# Patient Record
Sex: FEMALE | HISPANIC OR LATINO | Employment: STUDENT | URBAN - METROPOLITAN AREA
[De-identification: names, ages, dates, MRNs, and addresses within clinical notes are randomized per-mention and may not be internally consistent; named-entity substitution may affect disease eponyms.]

---

## 2024-01-20 ENCOUNTER — OFFICE VISIT (OUTPATIENT)
Dept: URGENT CARE | Facility: URGENT CARE | Age: 29
End: 2024-01-20
Payer: COMMERCIAL

## 2024-01-20 VITALS
DIASTOLIC BLOOD PRESSURE: 68 MMHG | TEMPERATURE: 98.9 F | RESPIRATION RATE: 16 BRPM | WEIGHT: 168.06 LBS | HEART RATE: 72 BPM | SYSTOLIC BLOOD PRESSURE: 110 MMHG | OXYGEN SATURATION: 98 %

## 2024-01-20 DIAGNOSIS — K13.0 CHAPPED LIPS: Primary | ICD-10-CM

## 2024-01-20 PROCEDURE — 99203 OFFICE O/P NEW LOW 30 MIN: CPT | Performed by: PHYSICIAN ASSISTANT

## 2024-01-20 NOTE — PROGRESS NOTES
Chief Complaint   Patient presents with    Mouth/Lip Problem     4 days, dry lips-burning sensation       ASSESSMENT/PLAN:  Cristina was seen today for mouth/lip problem.    Diagnoses and all orders for this visit:    Chapped lips    Avoid irritants, emollients several times a day, increase fluids.   Follow-up if not improving in the next 2 weeks    Scar Andre PA-C      SUBJECTIVE:  Cristina is a 28 year old female who presents to urgent care with  4 days of a chapped lips sensation that seems to be getting worse.  She does not feel like they are swollen but they feel dry, tight and painful especially when she moves her lips.  She tried Chapstick and has been now doing Aquaphor before bed.  Feels otherwise well.  Has not worn make-up in about 3 weeks.  No new foods.  Does eat spicy food occasionally.    ROS: Pertinent ROS neg other than the symptoms noted above in the HPI.     OBJECTIVE:  /68   Pulse 72   Temp 98.9  F (37.2  C)   Resp 16   Wt 76.2 kg (168 lb 1 oz)   SpO2 98%    GENERAL: alert and no distress  EYES: Eyes grossly normal to inspection, PERRL and conjunctivae and sclerae normal  HENT: Lips dry, appear slightly erythematous, nonswollen, no oropharynx erythema or ulcerations.  No maceration of the corner of lips but some mild angular cheilitis noted    DIAGNOSTICS    No results found for any visits on 01/20/24.     No current outpatient medications on file.     No current facility-administered medications for this visit.      There is no problem list on file for this patient.     No past medical history on file.  No past surgical history on file.  No family history on file.  Social History     Tobacco Use    Smoking status: Never    Smokeless tobacco: Never   Substance Use Topics    Alcohol use: Not on file              The plan of care was discussed with the patient. They understand and agree with the course of treatment prescribed. A printed summary was given including instructions and  medications.  The use of Dragon/Tripshareation services may have been used to construct the content in this note; any grammatical or spelling errors are non-intentional. Please contact the author of this note directly if you are in need of any clarification.

## 2024-02-21 SDOH — HEALTH STABILITY: PHYSICAL HEALTH: ON AVERAGE, HOW MANY DAYS PER WEEK DO YOU ENGAGE IN MODERATE TO STRENUOUS EXERCISE (LIKE A BRISK WALK)?: 0 DAYS

## 2024-02-21 SDOH — HEALTH STABILITY: PHYSICAL HEALTH: ON AVERAGE, HOW MANY MINUTES DO YOU ENGAGE IN EXERCISE AT THIS LEVEL?: 20 MIN

## 2024-02-21 ASSESSMENT — PATIENT HEALTH QUESTIONNAIRE - PHQ9: SUM OF ALL RESPONSES TO PHQ QUESTIONS 1-9: 10

## 2024-02-21 ASSESSMENT — SOCIAL DETERMINANTS OF HEALTH (SDOH): HOW OFTEN DO YOU GET TOGETHER WITH FRIENDS OR RELATIVES?: TWICE A WEEK

## 2024-02-21 NOTE — COMMUNITY RESOURCES LIST (ENGLISH)
02/21/2024   [facility]  N/A  For questions about this resource list or additional care needs, please contact your primary care clinic or care manager.  Phone: [phone]   Email: N/A   Address: [address] [place] [address]   Hours: N/A        Exercise and Recreation       Gym or workout facility  1  [facility] [employer] [facility] [place] Distance: 0.45 miles      In-Person   [address] [place] [address]  Language: English  Hours: Mon - Fri 3:00 PM - 9:00 PM  Fees: Free, Self Pay   Phone: ([phone]) [phone] Email: [email or web address] Website: [email or web address]://www.[employer]/parks__destinations/recreation_centers/[facility]_recreation_center/     2  [facility] [place] Distance: 0.86 miles      In-Person   [address] [place] [address]  Language: English, Hmong, Slovak, Frisian  Hours: Mon - Fri 3:00 PM - 9:00 PM , Sat 9:00 AM - 4:00 PM  Fees: Free, Self Pay   Phone: [phone] Email: [email or web address] Website: [email or web address]://www.[employer]/parks__destinations/recreation_centers/[place]_recreation_center/          Important Numbers & Websites       Emergency Services   911  [facility]   311  Poison Control   (136) 686-1518  Suicide Prevention Lifeline   (669) 835-9996 (TALK)  Child Abuse Hotline   (163) 854-1677 (4-A-Child)  Sexual Assault Hotline   (943) 532-3259 (HOPE)  National Runaway Safeline   (176) 813-3741 (RUNAWAY)  All-Options Talkline   (798) 650-9125  Substance Abuse Referral   (685) 717-4260 (HELP)

## 2024-02-21 NOTE — COMMUNITY RESOURCES LIST (ENGLISH)
02/21/2024   [facility] [employer] [facility]  N/A  For questions about this resource list or additional care needs, please contact your primary care clinic or care manager.  Phone: [phone]   Email: N/A   Address: [address] [place] [id number]   Hours: N/A        Exercise and Recreation       Gym or workout facility  1  [place] [facility]creation [place] Distance: 0.45 miles      In-Person   [address] [place] [address]  Language: English  Hours: Mon - Fri 3:00 PM - 9:00 PM  Fees: Free, Self Pay   Phone: ([phone]) [phone] Email: [email or web address] Website: [email or web address]://www.[employer]/parks__destinations/recreation_centers/[facility]_recreation_center/     2  [facility] [place] Distance: 0.86 miles      In-Person   [address] [place] [address]  Language: English, Hmong, Tuvaluan, French  Hours: Mon - Fri 3:00 PM - 9:00 PM , Sat 9:00 AM - 4:00 PM  Fees: Free, Self Pay   Phone: [phone]) [phone] Email: [email or web address] Website: [email or web address]://www.[employer]/parks__destinations/recreation_centers/[place]_recreation_center/          Important Numbers & Websites       Emergency Services   911  [facility]   311  Poison Control   (542) 746-6479  Suicide Prevention Lifeline   (549) 148-6631 (TALK)  Child Abuse Hotline   (153) 200-6664 (4-A-Child)  Sexual Assault Hotline   (853) 569-8346 (HOPE)  National Runaway Safeline   (768) 113-3719 (RUNAWAY)  All-Options Talkline   (643) 510-8693  Substance Abuse Referral   (347) 820-3103 (HELP)

## 2024-03-10 ENCOUNTER — HEALTH MAINTENANCE LETTER (OUTPATIENT)
Age: 29
End: 2024-03-10

## 2024-03-20 ENCOUNTER — OFFICE VISIT (OUTPATIENT)
Dept: FAMILY MEDICINE | Facility: CLINIC | Age: 29
End: 2024-03-20
Payer: COMMERCIAL

## 2024-03-20 VITALS
OXYGEN SATURATION: 98 % | TEMPERATURE: 98.1 F | HEIGHT: 63 IN | SYSTOLIC BLOOD PRESSURE: 105 MMHG | RESPIRATION RATE: 15 BRPM | BODY MASS INDEX: 29.66 KG/M2 | WEIGHT: 167.4 LBS | DIASTOLIC BLOOD PRESSURE: 71 MMHG | HEART RATE: 61 BPM

## 2024-03-20 DIAGNOSIS — Z11.59 NEED FOR HEPATITIS C SCREENING TEST: ICD-10-CM

## 2024-03-20 DIAGNOSIS — Z11.3 ROUTINE SCREENING FOR STI (SEXUALLY TRANSMITTED INFECTION): ICD-10-CM

## 2024-03-20 DIAGNOSIS — N92.6 IRREGULAR PERIODS/MENSTRUAL CYCLES: ICD-10-CM

## 2024-03-20 DIAGNOSIS — E04.9 ENLARGED THYROID: ICD-10-CM

## 2024-03-20 DIAGNOSIS — K62.5 BRIGHT RED BLOOD PER RECTUM: ICD-10-CM

## 2024-03-20 DIAGNOSIS — Z00.00 ROUTINE GENERAL MEDICAL EXAMINATION AT A HEALTH CARE FACILITY: ICD-10-CM

## 2024-03-20 DIAGNOSIS — Z11.4 SCREENING FOR HIV (HUMAN IMMUNODEFICIENCY VIRUS): ICD-10-CM

## 2024-03-20 PROBLEM — F41.9 ANXIETY: Status: ACTIVE | Noted: 2020-07-21

## 2024-03-20 LAB
ERYTHROCYTE [DISTWIDTH] IN BLOOD BY AUTOMATED COUNT: 13.8 % (ref 10–15)
HBA1C MFR BLD: 5.5 % (ref 0–5.6)
HCT VFR BLD AUTO: 40.6 % (ref 35–47)
HGB BLD-MCNC: 13.1 G/DL (ref 11.7–15.7)
MCH RBC QN AUTO: 28.3 PG (ref 26.5–33)
MCHC RBC AUTO-ENTMCNC: 32.3 G/DL (ref 31.5–36.5)
MCV RBC AUTO: 88 FL (ref 78–100)
PLATELET # BLD AUTO: 257 10E3/UL (ref 150–450)
RBC # BLD AUTO: 4.63 10E6/UL (ref 3.8–5.2)
WBC # BLD AUTO: 6.2 10E3/UL (ref 4–11)

## 2024-03-20 PROCEDURE — 90471 IMMUNIZATION ADMIN: CPT | Performed by: NURSE PRACTITIONER

## 2024-03-20 PROCEDURE — 83002 ASSAY OF GONADOTROPIN (LH): CPT | Performed by: NURSE PRACTITIONER

## 2024-03-20 PROCEDURE — 99000 SPECIMEN HANDLING OFFICE-LAB: CPT | Performed by: NURSE PRACTITIONER

## 2024-03-20 PROCEDURE — 87591 N.GONORRHOEAE DNA AMP PROB: CPT | Performed by: NURSE PRACTITIONER

## 2024-03-20 PROCEDURE — 86803 HEPATITIS C AB TEST: CPT | Performed by: NURSE PRACTITIONER

## 2024-03-20 PROCEDURE — 90715 TDAP VACCINE 7 YRS/> IM: CPT | Performed by: NURSE PRACTITIONER

## 2024-03-20 PROCEDURE — 84443 ASSAY THYROID STIM HORMONE: CPT | Performed by: NURSE PRACTITIONER

## 2024-03-20 PROCEDURE — 91320 SARSCV2 VAC 30MCG TRS-SUC IM: CPT | Performed by: NURSE PRACTITIONER

## 2024-03-20 PROCEDURE — 84270 ASSAY OF SEX HORMONE GLOBUL: CPT | Performed by: NURSE PRACTITIONER

## 2024-03-20 PROCEDURE — 83036 HEMOGLOBIN GLYCOSYLATED A1C: CPT | Performed by: NURSE PRACTITIONER

## 2024-03-20 PROCEDURE — 80053 COMPREHEN METABOLIC PANEL: CPT | Performed by: NURSE PRACTITIONER

## 2024-03-20 PROCEDURE — 36415 COLL VENOUS BLD VENIPUNCTURE: CPT | Performed by: NURSE PRACTITIONER

## 2024-03-20 PROCEDURE — 87389 HIV-1 AG W/HIV-1&-2 AB AG IA: CPT | Performed by: NURSE PRACTITIONER

## 2024-03-20 PROCEDURE — 99395 PREV VISIT EST AGE 18-39: CPT | Mod: 25 | Performed by: NURSE PRACTITIONER

## 2024-03-20 PROCEDURE — 83498 ASY HYDROXYPROGESTERONE 17-D: CPT | Performed by: NURSE PRACTITIONER

## 2024-03-20 PROCEDURE — 87491 CHLMYD TRACH DNA AMP PROBE: CPT | Performed by: NURSE PRACTITIONER

## 2024-03-20 PROCEDURE — 90480 ADMN SARSCOV2 VAC 1/ONLY CMP: CPT | Performed by: NURSE PRACTITIONER

## 2024-03-20 PROCEDURE — 84146 ASSAY OF PROLACTIN: CPT | Performed by: NURSE PRACTITIONER

## 2024-03-20 PROCEDURE — 82533 TOTAL CORTISOL: CPT | Performed by: NURSE PRACTITIONER

## 2024-03-20 PROCEDURE — 99214 OFFICE O/P EST MOD 30 MIN: CPT | Mod: 25 | Performed by: NURSE PRACTITIONER

## 2024-03-20 PROCEDURE — 82626 DEHYDROEPIANDROSTERONE: CPT | Mod: 90 | Performed by: NURSE PRACTITIONER

## 2024-03-20 PROCEDURE — 85027 COMPLETE CBC AUTOMATED: CPT | Performed by: NURSE PRACTITIONER

## 2024-03-20 PROCEDURE — 84403 ASSAY OF TOTAL TESTOSTERONE: CPT | Performed by: NURSE PRACTITIONER

## 2024-03-20 SDOH — HEALTH STABILITY: PHYSICAL HEALTH: ON AVERAGE, HOW MANY MINUTES DO YOU ENGAGE IN EXERCISE AT THIS LEVEL?: 20 MIN

## 2024-03-20 SDOH — HEALTH STABILITY: PHYSICAL HEALTH: ON AVERAGE, HOW MANY DAYS PER WEEK DO YOU ENGAGE IN MODERATE TO STRENUOUS EXERCISE (LIKE A BRISK WALK)?: 2 DAYS

## 2024-03-20 ASSESSMENT — SOCIAL DETERMINANTS OF HEALTH (SDOH): HOW OFTEN DO YOU GET TOGETHER WITH FRIENDS OR RELATIVES?: ONCE A WEEK

## 2024-03-20 ASSESSMENT — PAIN SCALES - GENERAL: PAINLEVEL: NO PAIN (0)

## 2024-03-20 ASSESSMENT — PATIENT HEALTH QUESTIONNAIRE - PHQ9
10. IF YOU CHECKED OFF ANY PROBLEMS, HOW DIFFICULT HAVE THESE PROBLEMS MADE IT FOR YOU TO DO YOUR WORK, TAKE CARE OF THINGS AT HOME, OR GET ALONG WITH OTHER PEOPLE: SOMEWHAT DIFFICULT
SUM OF ALL RESPONSES TO PHQ QUESTIONS 1-9: 10
SUM OF ALL RESPONSES TO PHQ QUESTIONS 1-9: 10

## 2024-03-20 NOTE — LETTER
My Depression Action Plan  Name: Cristina Gonzalez   Date of Birth 1995  Date: 3/20/2024    My doctor: No Ref-Primary, Physician   My clinic: [facility]  [address] [place] [address]  [phone]            GREEN    ZONE   Good Control    What it looks like:   Things are going generally well. You have normal ups and downs. You may even feel depressed from time to time, but bad moods usually last less than a day.   What you need to do:  Continue to care for yourself (see self care plan)  Check your depression survival kit and update it as needed  Follow your physician s recommendations including any medication.  Do not stop taking medication unless you consult with your physician first.             YELLOW         ZONE Getting Worse    What it looks like:   Depression is starting to interfere with your life.   It may be hard to get out of bed; you may be starting to isolate yourself from others.  Symptoms of depression are starting to last most all day and this has happened for several days.   You may have suicidal thoughts but they are not constant.   What you need to do:     Call your care team. Your response to treatment will improve if you keep your care team informed of your progress. Yellow periods are signs an adjustment may need to be made.     Continue your self-care.  Just get dressed and ready for the day.  Don't give yourself time to talk yourself out of it.    Talk to someone in your support network.    Open up your Depression Self-Care Plan/Wellness Kit.             RED    ZONE Medical Alert - Get Help    What it looks like:   Depression is seriously interfering with your life.   You may experience these or other symptoms: You can t get out of bed most days, can t work or engage in other necessary activities, you have trouble taking care of basic hygiene, or basic responsibilities, thoughts of suicide or death that will not go  away, self-injurious behavior.     What you need to do:  Call your care team and request a same-day appointment. If they are not available (weekends or after hours) call your local crisis line, emergency room or 911.          Depression Self-Care Plan / Wellness Kit    Many people find that medication and therapy are helpful treatments for managing depression. In addition, making small changes to your everyday life can help to boost your mood and improve your wellbeing. Below are some tips for you to consider. Be sure to talk with your medical provider and/or behavioral health consultant if your symptoms are worsening or not improving.     Sleep   Sleep hygiene  means all of the habits that support good, restful sleep. It includes maintaining a consistent bedtime and wake time, using your bedroom only for sleeping or sex, and keeping the bedroom dark and free of distractions like a computer, smartphone, or television.     Develop a Healthy Routine  Maintain good hygiene. Get out of bed in the morning, make your bed, brush your teeth, take a shower, and get dressed. Don t spend too much time viewing media that makes you feel stressed. Find time to relax each day.    Exercise  Get some form of exercise every day. This will help reduce pain and release endorphins, the  feel good  chemicals in your brain. It can be as simple as just going for a walk or doing some gardening, anything that will get you moving.      Diet  Strive to eat healthy foods, including fruits and vegetables. Drink plenty of water. Avoid excessive sugar, caffeine, alcohol, and other mood-altering substances.     Stay Connected with Others  Stay in touch with friends and family members.    Manage Your Mood  Try deep breathing, massage therapy, biofeedback, or meditation. Take part in fun activities when you can. Try to find something to smile about each day.     Psychotherapy  Be open to working with a therapist if your provider recommends it.      Medication  Be sure to take your medication as prescribed. Most anti-depressants need to be taken every day. It usually takes several weeks for medications to work. Not all medicines work for all people. It is important to follow-up with your provider to make sure you have a treatment plan that is working for you. Do not stop your medication abruptly without first discussing it with your provider.    Crisis Resources   These hotlines are for both adults and children. They and are open 24 hours a day, 7 days a week unless noted otherwise.    National Suicide Prevention Lifeline   988 or 8-340-506-YXQV (5025)    Crisis Text Line    www.crisistextline.org  Text HOME to 458567 from anywhere in the [place], anytime, about any type of crisis. A live, trained crisis counselor will receive the text and respond quickly.    [place] Lifeline for LGBTQ Youth  A national crisis intervention and suicide lifeline for LGBTQ youth under 25. Provides a safe place to talk without judgement. Call 1-477.251.7955; text START to 216495 or visit www.thetrevorproject.org to talk to a trained counselor.    For [facility] crisis numbers, visit the [facility] website at:  https://[place].gov/dhs/people-we-serve/adults/health-care/mental-health/resources/crisis-contacts.jsp

## 2024-03-20 NOTE — PROGRESS NOTES
"Preventive Care Visit  [facility]  DRU Hernandez CNP, Family Medicine  Mar 20, 2024      Assessment & Plan     Routine general medical examination at a health care facility  - Comprehensive metabolic panel  - CBC with platelets    Screening for HIV (human immunodeficiency virus)  - HIV Antigen Antibody Combo    Need for hepatitis C screening test  - Hepatitis C Screen Reflex to HCV RNA Quant and Genotype    Irregular periods/menstrual cycles  Irregular periods with reported episodes of prolonged or heavy bleeding with painful menstrual cramps. R/O hormonal/PCOS/structural etiology.   - Hemoglobin A1c  - TSH with free T4 reflex  - Luteinizing Hormone  - Prolactin  - Dehydroepiandrosterone  - Testosterone Free and Total  - Luteinizing Hormone  - US Pelvic Complete with Transvaginal  - Cortisol  - 17 OH progesterone    Routine screening for STI (sexually transmitted infection)  - Chlamydia trachomatis/Neisseria gonorrhoeae by PCR    Bright red blood per rectum  Also experiencing rectal pressure.  - Colonoscopy Screening  Referral    Enlarged thyroid  - US Head Neck Soft Tissue                BMI  Estimated body mass index is 30.13 kg/m  as calculated from the following:    Height as of this encounter: 1.588 m (5' 2.5\").    Weight as of this encounter: 75.9 kg (167 lb 6.4 oz).   Weight management plan: Discussed healthy diet and exercise guidelines    Counseling  Appropriate preventive services were discussed with this patient, including applicable screening as appropriate for fall prevention, nutrition, physical activity, Tobacco-use cessation, weight loss and cognition.  Checklist reviewing preventive services available has been given to the patient.  Reviewed patient's diet, addressing concerns and/or questions.   She is at risk for lack of exercise and has been provided with information to increase physical activity for the benefit of her well-being.   The patient was " instructed to see the dentist every 6 months.   The patient's PHQ-9 score is consistent with moderate depression. She was provided with information regarding depression.           Margarette Evans is a 28 year old, presenting for the following:  Recheck Medication and Physical (skin dryness and  discharge coming from my nipple.)    Concerns:   Wondering if she has PCOS: irregular periods, when gets periods has heavy bleeding, cramps are unbearable - symptoms ongoing for many years. Has a few hairs on chin. No acne currently, but gets acne around periods.     Would like diabetes screening  Grandma has diabetes on mom's side - is blind due to diabetes  Father has diabetes type 2    Seeing a therapist once a week for anxiety and depression- feels symptoms are well controlled.     Noticed drops of blood on toilet paper when wiping after having BM's, also having rectal pressure - randomly. Symptoms ongoing since April last year. Reports intermittent constipation. Has been been eating a high fiber diet but symptoms persist. BM's daily or twice daily. Avoids lactose given lactose intolerance- removed lactose from diet and symptoms still there.  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    HPI              3/20/2024   General Health   How would you rate your overall physical health? (!) FAIR   Feel stress (tense, anxious, or unable to sleep) To some extent   (!) STRESS CONCERN      3/20/2024   Nutrition   Three or more servings of calcium each day? (!) NO   Diet: Regular (no restrictions)   How many servings of fruit and vegetables per day? (!) 2-3   How many sweetened beverages each  "day? (!) 3         3/20/2024   Exercise   Days per week of moderate/strenous exercise 2 days   Average minutes spent exercising at this level 20 min   (!) EXERCISE CONCERN      3/20/2024   Social Factors   Frequency of gathering with friends or relatives Once a week   Worry food won't last until get money to buy more No   Food not last or not have enough money for food? No   Do you have housing?  Yes   Are you worried about losing your housing? No   Lack of transportation? No   Unable to get utilities (heat,electricity)? No         3/20/2024   Dental   Dentist two times every year? (!) NO         3/20/2024   TB Screening   Were you born outside of the [place]? No       Today's PHQ-9 Score:       3/20/2024     8:47 AM   PHQ-9 SCORE   PHQ-9 Total Score MyChart 10 (Moderate depression)   PHQ-9 Total Score 10         3/20/2024   Substance Use   Alcohol more than 3/day or more than 7/wk Not Applicable   Do you use any other substances recreationally? No     Social History     Tobacco Use    Smoking status: Never    Smokeless tobacco: Never   Vaping Use    Vaping Use: Never used             3/20/2024   Breast Cancer Screening   Family history of breast, colon, or ovarian cancer? No / Unknown      Mammogram Screening - Patient under 40 years of age: Routine Mammogram Screening not recommended.           3/20/2024   One time HIV Screening   Previous HIV test? No         3/20/2024   STI Screening   New sexual partner(s) since last STI/HIV test? No     History of abnormal Pap smear: NO - age 30- 65 PAP every 3 years recommended           3/20/2024   Contraception/Family Planning   Questions about contraception or family planning No        Reviewed and updated as needed this visit by Provider         Sedrick Mosley                     Objective    Exam  /71 (BP Location: Left arm, Patient Position: Sitting, Cuff Size: Adult Large)   Pulse 61   Temp 98.1  F (36.7  C)   Resp 15   Ht 1.588 m (5' 2.5\")   Wt 75.9 kg (167 lb 6.4 oz)  " " LMP 02/29/2024 (Approximate)   SpO2 98%   BMI 30.13 kg/m     Estimated body mass index is 30.13 kg/m  as calculated from the following:    Height as of this encounter: 1.588 m (5' 2.5\").    Weight as of this encounter: 75.9 kg (167 lb 6.4 oz).    Physical Exam  GENERAL: alert and no distress  EYES: Eyes grossly normal to inspection, PERRL and conjunctivae and sclerae normal  HENT: ear canals and TM's normal, nose and mouth without ulcers or lesions  NECK: no adenopathy, no asymmetry, masses, or scars  RESP: lungs clear to auscultation - no rales, rhonchi or wheezes  CV: regular rate and rhythm, normal S1 S2, no S3 or S4, no murmur, click or rub, no peripheral edema  ABDOMEN: soft, nontender, no hepatosplenomegaly, no masses and bowel sounds normal  RECTAL: no rectal fissures- positive for hemorrhoidal skin tag x1  MS: no gross musculoskeletal defects noted, no edema  SKIN: no suspicious lesions or rashes  NEURO: Normal strength and tone, mentation intact and speech normal  PSYCH: mentation appears normal, affect normal/bright        Signed Electronically by: DRU Henrandez CNP    "

## 2024-03-20 NOTE — PATIENT INSTRUCTIONS
Preventive Care Advice   This is general advice given by our system to help you stay healthy. However, your care team may have specific advice just for you. Please talk to your care team about your preventive care needs.  Nutrition  Eat 5 or more servings of fruits and vegetables each day.  Try wheat bread, brown rice and whole grain pasta (instead of white bread, rice, and pasta).  Get enough calcium and vitamin D. Check the label on foods and aim for 100% of the RDA (recommended daily allowance).  Lifestyle  Exercise at least 150 minutes each week   (30 minutes a day, 5 days a week).  Do muscle strengthening activities 2 days a week. These help control your weight and prevent disease.  No smoking.  Wear sunscreen to prevent skin cancer.  Have a dental exam and cleaning every 6 months.  Yearly exams  See your health care team every year to talk about:  Any changes in your health.  Any medicines your care team has prescribed.  Preventive care, family planning, and ways to prevent chronic diseases.  Shots (vaccines)   HPV shots (up to age 26), if you've never had them before.  Hepatitis B shots (up to age 59), if you've never had them before.  COVID-19 shot: Get this shot when it's due.  Flu shot: Get a flu shot every year.  Tetanus shot: Get a tetanus shot every 10 years.  Pneumococcal, hepatitis A, and RSV shots: Ask your care team if you need these based on your risk.  Shingles shot (for age 50 and up).  General health tests  Diabetes screening:  Starting at age 35, Get screened for diabetes at least every 3 years.  If you are younger than age 35, ask your care team if you should be screened for diabetes.  Cholesterol test: At age 39, start having a cholesterol test every 5 years, or more often if advised.  Bone density scan (DEXA): At age 50, ask your care team if you should have this scan for osteoporosis (brittle bones).  Hepatitis C: Get tested at least once in your life.  STIs (sexually transmitted  infections)  Before age 24: Ask your care team if you should be screened for STIs.  After age 24: Get screened for STIs if you're at risk. You are at risk for STIs (including HIV) if:  You are sexually active with more than one person.  You don't use condoms every time.  You or a partner was diagnosed with a sexually transmitted infection.  If you are at risk for HIV, ask about PrEP medicine to prevent HIV.  Get tested for HIV at least once in your life, whether you are at risk for HIV or not.  Cancer screening tests  Cervical cancer screening: If you have a cervix, begin getting regular cervical cancer screening tests at age 21. Most people who have regular screenings with normal results can stop after age 65. Talk about this with your provider.  Breast cancer scan (mammogram): If you've ever had breasts, begin having regular mammograms starting at age 40. This is a scan to check for breast cancer.  Colon cancer screening: It is important to start screening for colon cancer at age 45.  Have a colonoscopy test every 10 years (or more often if you're at risk) Or, ask your provider about stool tests like a FIT test every year or Cologuard test every 3 years.  To learn more about your testing options, visit: https://www.[employer]/104121.pdf.  For help making a decision, visit: https://bit.ly/yh96305.  Prostate cancer screening test: If you have a prostate and are age 55 to 69, ask your provider if you would benefit from a yearly prostate cancer screening test.  Lung cancer screening: If you are a current or former smoker age 50 to 80, ask your care team if ongoing lung cancer screenings are right for you.  For informational purposes only. Not to replace the advice of your health care provider. Copyright   2023 [employer]. All rights reserved. Clinically reviewed by the [facility] Transitions Program. [employer] 359351 - REV 01/24.    Learning About Stress  What is stress?     Stress is your  body's response to a hard situation. Your body can have a physical, emotional, or mental response. Stress is a fact of life for most people, and it affects everyone differently. What causes stress for you may not be stressful for someone else.  A lot of things can cause stress. You may feel stress when you go on a job interview, take a test, or run a race. This kind of short-term stress is normal and even useful. It can help you if you need to work hard or react quickly. For example, stress can help you finish an important job on time.  Long-term stress is caused by ongoing stressful situations or events. Examples of long-term stress include long-term health problems, ongoing problems at work, or conflicts in your family. Long-term stress can harm your health.  How does stress affect your health?  When you are stressed, your body responds as though you are in danger. It makes hormones that speed up your heart, make you breathe faster, and give you a burst of energy. This is called the fight-or-flight stress response. If the stress is over quickly, your body goes back to normal and no harm is done.  But if stress happens too often or lasts too long, it can have bad effects. Long-term stress can make you more likely to get sick, and it can make symptoms of some diseases worse. If you tense up when you are stressed, you may develop neck, shoulder, or low back pain. Stress is linked to high blood pressure and heart disease.  Stress also harms your emotional health. It can make you deal, tense, or depressed. Your relationships may suffer, and you may not do well at work or school.  What can you do to manage stress?  You can try these things to help manage stress:   Do something active. Exercise or activity can help reduce stress. Walking is a great way to get started. Even everyday activities such as housecleaning or yard work can help.  Try yoga or liv chi. These techniques combine exercise and meditation. You may need  some training at first to learn them.  Do something you enjoy. For example, listen to music or go to a movie. Practice your hobby or do volunteer work.  Meditate. This can help you relax, because you are not worrying about what happened before or what may happen in the future.  Do guided imagery. Imagine yourself in any setting that helps you feel calm. You can use online videos, books, or a teacher to guide you.  Do breathing exercises. For example:  From a standing position, bend forward from the waist with your knees slightly bent. Let your arms dangle close to the floor.  Breathe in slowly and deeply as you return to a standing position. Roll up slowly and lift your head last.  Hold your breath for just a few seconds in the standing position.  Breathe out slowly and bend forward from the waist.  Let your feelings out. Talk, laugh, cry, and express anger when you need to. Talking with supportive friends or family, a counselor, or a shaw leader about your feelings is a healthy way to relieve stress. Avoid discussing your feelings with people who make you feel worse.  Write. It may help to write about things that are bothering you. This helps you find out how much stress you feel and what is causing it. When you know this, you can find better ways to cope.  What can you do to prevent stress?  You might try some of these things to help prevent stress:  Manage your time. This helps you find time to do the things you want and need to do.  Get enough sleep. Your body recovers from the stresses of the day while you are sleeping.  Get support. Your family, friends, and community can make a difference in how you experience stress.  Limit your news feed. Avoid or limit time on social media or news that may make you feel stressed.  Do something active. Exercise or activity can help reduce stress. Walking is a great way to get started.  Where can you learn more?  Go to https://www.healthwise.net/patiented  Enter N032 in the  "search box to learn more about \"Learning About Stress.\"  Current as of: October 24, 2023               Content Version: 14.0    3843-9026 [employer].   Care instructions adapted under license by your healthcare professional. If you have questions about a medical condition or this instruction, always ask your healthcare professional. [employer] disclaims any warranty or liability for your use of this information.      Learning About Depression Screening  What is depression screening?  Depression screening is a way to see if you have depression symptoms. It may be done by a doctor or counselor. It's often part of a routine checkup. That's because your mental health is just as important as your physical health.  Depression is a mental health condition that affects how you feel, think, and act. You may:  Have less energy.  Lose interest in your daily activities.  Feel sad and grouchy for a long time.  Depression is very common. It affects people of all ages.  Many things can lead to depression. Some people become depressed after they have a stroke or find out they have a major illness like cancer or heart disease. The death of a loved one or a breakup may lead to depression. It can run in families. Most experts believe that a combination of inherited genes and stressful life events can cause it.  What happens during screening?  You may be asked to fill out a form about your depression symptoms. You and the doctor will discuss your answers. The doctor may ask you more questions to learn more about how you think, act, and feel.  What happens after screening?  If you have symptoms of depression, your doctor will talk to you about your options.  Doctors usually treat depression with medicines or counseling. Often, combining the two works best. Many people don't get help because they think that they'll get over the depression on their own. But people with depression may not get better unless they " "get treatment.  The cause of depression is not well understood. There may be many factors involved. But if you have depression, it's not your fault.  A serious symptom of depression is thinking about death or suicide. If you or someone you care about talks about this or about feeling hopeless, get help right away.  It's important to know that depression can be treated. Medicine, counseling, and self-care may help.  Where can you learn more?  Go to https://www.[employer].net/patiented  Enter T185 in the search box to learn more about \"Learning About Depression Screening.\"  Current as of: June 24, 2023               Content Version: 14.0    7323-1157 [employer].   Care instructions adapted under license by your healthcare professional. If you have questions about a medical condition or this instruction, always ask your healthcare professional. [employer] disclaims any warranty or liability for your use of this information.      "

## 2024-03-21 LAB
ALBUMIN SERPL BCG-MCNC: 4.5 G/DL (ref 3.5–5.2)
ALP SERPL-CCNC: 79 U/L (ref 40–150)
ALT SERPL W P-5'-P-CCNC: 43 U/L (ref 0–50)
ANION GAP SERPL CALCULATED.3IONS-SCNC: 11 MMOL/L (ref 7–15)
AST SERPL W P-5'-P-CCNC: 25 U/L (ref 0–45)
BILIRUB SERPL-MCNC: 0.3 MG/DL
BUN SERPL-MCNC: 9.1 MG/DL (ref 6–20)
C TRACH DNA SPEC QL PROBE+SIG AMP: NEGATIVE
CALCIUM SERPL-MCNC: 9.4 MG/DL (ref 8.6–10)
CHLORIDE SERPL-SCNC: 104 MMOL/L (ref 98–107)
CORTIS SERPL-MCNC: 4.2 UG/DL
CREAT SERPL-MCNC: 0.64 MG/DL (ref 0.51–0.95)
DEPRECATED HCO3 PLAS-SCNC: 24 MMOL/L (ref 22–29)
EGFRCR SERPLBLD CKD-EPI 2021: >90 ML/MIN/1.73M2
GLUCOSE SERPL-MCNC: 113 MG/DL (ref 70–99)
HCV AB SERPL QL IA: NONREACTIVE
HIV 1+2 AB+HIV1 P24 AG SERPL QL IA: NONREACTIVE
LH SERPL-ACNC: 10.5 MIU/ML
N GONORRHOEA DNA SPEC QL NAA+PROBE: NEGATIVE
POTASSIUM SERPL-SCNC: 4 MMOL/L (ref 3.4–5.3)
PROLACTIN SERPL 3RD IS-MCNC: 12 NG/ML (ref 5–23)
PROT SERPL-MCNC: 7.6 G/DL (ref 6.4–8.3)
SHBG SERPL-SCNC: 23 NMOL/L (ref 30–135)
SODIUM SERPL-SCNC: 139 MMOL/L (ref 135–145)
TSH SERPL DL<=0.005 MIU/L-ACNC: 1.18 UIU/ML (ref 0.3–4.2)

## 2024-03-22 LAB
TESTOST FREE SERPL-MCNC: 0.32 NG/DL
TESTOST SERPL-MCNC: 15 NG/DL (ref 8–60)

## 2024-03-23 LAB — DHEA SERPL-MCNC: 2.17 NG/ML

## 2024-03-27 ENCOUNTER — ANCILLARY PROCEDURE (OUTPATIENT)
Dept: ULTRASOUND IMAGING | Facility: CLINIC | Age: 29
End: 2024-03-27
Attending: NURSE PRACTITIONER
Payer: COMMERCIAL

## 2024-03-27 DIAGNOSIS — N92.6 IRREGULAR PERIODS/MENSTRUAL CYCLES: ICD-10-CM

## 2024-03-27 DIAGNOSIS — E04.9 ENLARGED THYROID: ICD-10-CM

## 2024-03-27 PROCEDURE — 76830 TRANSVAGINAL US NON-OB: CPT | Performed by: RADIOLOGY

## 2024-03-27 PROCEDURE — 76856 US EXAM PELVIC COMPLETE: CPT | Performed by: RADIOLOGY

## 2024-03-27 PROCEDURE — 76536 US EXAM OF HEAD AND NECK: CPT | Mod: GC | Performed by: RADIOLOGY

## 2024-03-28 ENCOUNTER — TELEPHONE (OUTPATIENT)
Dept: GASTROENTEROLOGY | Facility: CLINIC | Age: 29
End: 2024-03-28
Payer: COMMERCIAL

## 2024-03-28 ENCOUNTER — HOSPITAL ENCOUNTER (OUTPATIENT)
Facility: AMBULATORY SURGERY CENTER | Age: 29
End: 2024-03-28
Attending: SURGERY

## 2024-03-28 DIAGNOSIS — K62.5 BRIGHT RED BLOOD PER RECTUM: Primary | ICD-10-CM

## 2024-03-28 LAB — 17OHP SERPL-MCNC: 27 NG/DL

## 2024-03-28 NOTE — TELEPHONE ENCOUNTER
"Endoscopy Scheduling Screen    Have you had a positive Covid test in the last 14 days?  No    What is your communication preference for Instructions and/or Bowel Prep?   MyChart    What insurance is in the chart?  Other:  [employer]    Ordering/Referring Provider:     AARTI ORELLANA      (If ordering provider performs procedure, schedule with ordering provider unless otherwise instructed. )    BMI: Estimated body mass index is 30.13 kg/m  as calculated from the following:    Height as of 3/20/24: 1.588 m (5' 2.5\").    Weight as of 3/20/24: 75.9 kg (167 lb 6.4 oz).     Sedation Ordered  moderate sedation.   If patient BMI > 50 do not schedule in [facility].    If patient BMI > 45 do not schedule at [facility].    Are you taking methadone or Suboxone?  No    Have you had difficulties, pain, or discomfort during past endoscopy procedures?  No    Are you taking any prescription medications for pain 3 or more times per week?   NO, No RN review required.    Do you have a history of malignant hyperthermia?  No    (Females) Are you currently pregnant?   No     Have you been diagnosed or told you have pulmonary hypertension?   No    Do you have an LVAD?  No    Have you been told you have moderate to severe sleep apnea?  No    Have you been told you have COPD, asthma, or any other lung disease?  No    Do you have any heart conditions?  No     Have you ever had or are you waiting for an organ transplant?  No. Continue scheduling, no site restrictions.    Have you had a stroke or transient ischemic attack (TIA aka \"mini stroke\" in the last 6 months?   No    Have you been diagnosed with or been told you have cirrhosis of the liver?   No    Are you currently on dialysis?   No    Do you need assistance transferring?   No    BMI: Estimated body mass index is 30.13 kg/m  as calculated from the following:    Height as of 3/20/24: 1.588 m (5' 2.5\").    Weight as of 3/20/24: 75.9 kg (167 lb 6.4 oz).     Is patients BMI > 40 and scheduling " location [facility]?  No    Do you take an injectable medication for weight loss or diabetes (excluding insulin)?  No    Do you take the medication Naltrexone?  No    Do you take blood thinners?  No       Prep   Are you currently on dialysis or do you have chronic kidney disease?  No    Do you have a diagnosis of diabetes?  No    Do you have a diagnosis of cystic fibrosis (CF)?  No    On a regular basis do you go 3 -5 days between bowel movements?  Yes (Extended Prep)    BMI > 40?  No    Preferred Pharmacy:    [id number] - [place], [place] - [address]  [address]  [facility] [address]  Phone: [phone] Fax: [phone]      Final Scheduling Details     Procedure scheduled  Colonoscopy    Surgeon:  MICHELLE     Date of procedure:  06/12/2024     Pre-OP / PAC:   No - Not required for this site.    Location  [facility] - [facility] - Patient preference.    Sedation   Moderate Sedation - Per order.      Patient Reminders:   You will receive a call from a Nurse to review instructions and health history.  This assessment must be completed prior to your procedure.  Failure to complete the Nurse assessment may result in the procedure being cancelled.      On the day of your procedure, please designate an adult(s) who can drive you home stay with you for the next 24 hours. The medicines used in the exam will make you sleepy. You will not be able to drive.      You cannot take public transportation, ride share services, or non-medical taxi service without a responsible caregiver.  Medical transport services are allowed with the requirement that a responsible caregiver will receive you at your destination.  We require that drivers and caregivers are confirmed prior to your procedure.

## 2024-04-04 ENCOUNTER — OFFICE VISIT (OUTPATIENT)
Dept: MIDWIFE SERVICES | Facility: CLINIC | Age: 29
End: 2024-04-04
Payer: COMMERCIAL

## 2024-04-04 VITALS
WEIGHT: 169.3 LBS | OXYGEN SATURATION: 99 % | BODY MASS INDEX: 30.47 KG/M2 | SYSTOLIC BLOOD PRESSURE: 118 MMHG | HEART RATE: 69 BPM | DIASTOLIC BLOOD PRESSURE: 71 MMHG

## 2024-04-04 DIAGNOSIS — N92.6 IRREGULAR PERIODS/MENSTRUAL CYCLES: Primary | ICD-10-CM

## 2024-04-04 DIAGNOSIS — Z30.011 ENCOUNTER FOR INITIAL PRESCRIPTION OF CONTRACEPTIVE PILLS: ICD-10-CM

## 2024-04-04 DIAGNOSIS — E28.2 PCOS (POLYCYSTIC OVARIAN SYNDROME): ICD-10-CM

## 2024-04-04 PROCEDURE — 99204 OFFICE O/P NEW MOD 45 MIN: CPT | Performed by: ADVANCED PRACTICE MIDWIFE

## 2024-04-04 RX ORDER — DROSPIRENONE AND ETHINYL ESTRADIOL 0.02-3(28)
1 KIT ORAL DAILY
Qty: 84 TABLET | Refills: 3 | Status: SHIPPED | OUTPATIENT
Start: 2024-04-04

## 2024-04-04 NOTE — PROGRESS NOTES
Cristinafidel Gnozalez is a 28 year old here for consultation for PCOS.    Subjective  HPI:  For the past 10 years, Ivy has had irregular periods; she can go 3 months without period and then has severe cramping/bloating with period. She also experiences excess acne, hair loss, and dark hair growth on chin and chest. She was seen by Courtney Foster on 3/20: polycystic ovaries on TVUS, labs normal, and sent here for further eval.     GYN:  Irregular cycles. Menses lasting 7 days. Flow regular first two days to heavy. LMP 4/3/24. Contraception: none. STI history: none. Declines STI testing, she is in a long-term monogamous relationship. Does not desire pregnancy in the next year. Pap due in September, has appointment, denies history of abnormal paps.     Denies hypertension, migraine with aura, smoking tobacco, active or historical reproductive cancers, clotting disorders.      Objective:   /71 (BP Location: Left arm, Patient Position: Sitting, Cuff Size: Adult Regular)   Pulse 69   Wt 76.8 kg (169 lb 4.8 oz)   LMP 04/04/2024 (Approximate)   SpO2 99%   BMI 30.47 kg/m    Exam:  Constitutional: healthy, alert, and no distress  Psychiatric: mentation appears normal and affect normal/bright    EXAMINATION: US PELVIC TRANSABDOMINAL AND TRANSVAGINAL, 3/27/2024  12:10 PM      COMPARISON: None.     HISTORY: Irregular periods/menstrual cycles     TECHNIQUE: The pelvis was scanned in standard fashion with  transabdominal and transvaginal transducer(s) using both grey scale  and limited color Doppler techniques.     FINDINGS:  The uterus measures 7.8 x 4.6 x 3.7 cm , and there is no evidence of a  focal fibroid.  The endometrium is within normal limits and measures 6  mm. There is no free fluid in the pelvis.     The right ovary measures 2.2 x 2.3 x 2.1 cm. Volume 8 cc. and the left  ovary measures 3.2 x 1.5 x 2.9 cm. Volume 7.4 cc.. There is no adnexal  mass. There is normal blood flow to the ovaries. There  are  approximately 40 follicles on the right and 20 follicles on the left.                                                                      IMPRESSION:   1. Ovarian volumes and follicle counts as above. Correlate clinically  for possible PCOS.  2. Normal uterus.     ESTEPHANIE DAILEY MD     A/P:  Ivy meets [place] criteria for PCOS (hyperandrogenism, oligorrhea, and polycystic ovaries).    (N92.6) Irregular periods/menstrual cycles  (primary encounter diagnosis)    (E28.2) PCOS (polycystic ovarian syndrome)  Plan: Oral contraceptive and lifestyle management    (Z30.011) Encounter for initial prescription of contraceptive pills  Plan: drospirenone-ethinyl estradiol (CARLTON) 3-0.02 MG         Tablet    Educated on diagnosis. Discussed optimizing nutrition to help with PCOS symptoms and prevent future conditions of diabetes, dyslipidemia, and hypertension. Recent labs reviewed and educated on recommendations for screening for these conditions in the future. Risk vs benefit and side effects of combined OCP discussed, instructed to reach out if PCOS symptoms not improving.Counseled on potential impact of PCOS on fertility, no new questions at this time. Pap due in September, appointment scheduled.  Reach out with questions or concerns.    SAMANTHA Fermin    I was present with the NICHOLAS student who participated in the service and in the documentation of the services provided. I have verified the history and personally performed the physical exam and medical decision making, as documented by the student and edited by me.  Mykel Nieves CNM  April 4, 2024

## 2024-05-15 RX ORDER — BISACODYL 5 MG/1
TABLET, DELAYED RELEASE ORAL
Qty: 4 TABLET | Refills: 0 | Status: SHIPPED | OUTPATIENT
Start: 2024-05-15

## 2024-05-15 NOTE — TELEPHONE ENCOUNTER
Extended Golytely Bowel Prep . Instructions were sent via [employer]. Bowel prep was sent 5/15/2024 to [id number] - [place], [place] - [address]

## 2024-06-03 ENCOUNTER — TELEPHONE (OUTPATIENT)
Dept: GASTROENTEROLOGY | Facility: CLINIC | Age: 29
End: 2024-06-03

## 2024-06-03 NOTE — TELEPHONE ENCOUNTER
Caller: Cristina Haynes Analco Gonzalez   Reason for Reschedule/Cancellation (please be detailed, any staff messages or encounters to note?):     Per pt -- cancel only - no more INS for now       Prior to reschedule please review:  Ordering Provider:    Elizabeth Foster APRN CNP      Sedation Determined: moderate   Does patient have any ASC Exclusions, please identify?: n       Notes on Cancelled Procedure:  Procedure:Lower Endoscopy [Colonoscopy]   Date: 06/12/2024  [facility] [address] [place] [address], 5[address] Floor, [place] [address]  Surgeon: Harvey          Rescheduled: no

## 2024-08-12 ENCOUNTER — OFFICE VISIT (OUTPATIENT)
Dept: URGENT CARE | Facility: URGENT CARE | Age: 29
End: 2024-08-12

## 2024-08-12 ENCOUNTER — ANCILLARY PROCEDURE (OUTPATIENT)
Dept: GENERAL RADIOLOGY | Facility: CLINIC | Age: 29
End: 2024-08-12
Attending: PHYSICIAN ASSISTANT

## 2024-08-12 VITALS
WEIGHT: 170 LBS | OXYGEN SATURATION: 98 % | RESPIRATION RATE: 20 BRPM | DIASTOLIC BLOOD PRESSURE: 74 MMHG | HEART RATE: 57 BPM | BODY MASS INDEX: 32.1 KG/M2 | TEMPERATURE: 97.9 F | SYSTOLIC BLOOD PRESSURE: 119 MMHG | HEIGHT: 61 IN

## 2024-08-12 DIAGNOSIS — M25.562 ACUTE PAIN OF LEFT KNEE: ICD-10-CM

## 2024-08-12 DIAGNOSIS — M25.569 KNEE TENDERNESS: ICD-10-CM

## 2024-08-12 DIAGNOSIS — M25.462 SWELLING OF JOINT, KNEE, LEFT: ICD-10-CM

## 2024-08-12 PROCEDURE — 73562 X-RAY EXAM OF KNEE 3: CPT | Mod: LT | Performed by: STUDENT IN AN ORGANIZED HEALTH CARE EDUCATION/TRAINING PROGRAM

## 2024-08-12 PROCEDURE — 99214 OFFICE O/P EST MOD 30 MIN: CPT | Performed by: PHYSICIAN ASSISTANT

## 2024-08-12 RX ORDER — IBUPROFEN 600 MG/1
600 TABLET, FILM COATED ORAL EVERY 6 HOURS PRN
Qty: 40 TABLET | Refills: 0 | Status: SHIPPED | OUTPATIENT
Start: 2024-08-12

## 2024-08-12 ASSESSMENT — PAIN SCALES - GENERAL: PAINLEVEL: SEVERE PAIN (6)

## 2024-08-12 NOTE — PROGRESS NOTES
"SUBJECTIVE:  Chief Complaint   Patient presents with    Musculoskeletal Problem    Urgent Care     Cristina Gonzalez is a 28 year old female presents with a chief complaint of left knee pain.  The injury occurred months ago, but unknown  The patient complained of moderate pain  and has not had decreased ROM.  Pain exacerbated by weight-bearing and movement.  Relieved by rest.  She treated it initially with Tylenol and Ibuprofen. This is not the first time this type of injury has occurred to this patient.     No past medical history on file.  Current Outpatient Medications   Medication Sig Dispense Refill    ibuprofen (ADVIL/MOTRIN) 600 MG tablet Take 1 tablet (600 mg) by mouth every 6 hours as needed for moderate pain 40 tablet 0    bisacodyl (DULCOLAX) 5 MG EC tablet Two days prior to exam take two (2) tablets at 4pm. One day prior to exam take two (2) tablets at 4pm (Patient not taking: Reported on 8/12/2024) 4 tablet 0    drospirenone-ethinyl estradiol (CARLTON) 3-0.02 MG tablet Take 1 tablet by mouth daily (Patient not taking: Reported on 8/12/2024) 84 tablet 3    polyethylene glycol (GOLYTELY) 236 g suspension Take as directed in colonoscopy prep instructions (Patient not taking: Reported on 8/12/2024) 8000 mL 0     Social History     Tobacco Use    Smoking status: Never    Smokeless tobacco: Never   Substance Use Topics    Alcohol use: Not on file       ROS:  Review of systems negative except as stated above.    EXAM:   /74   Pulse 57   Temp 97.9  F (36.6  C) (Oral)   Resp 20   Ht 1.549 m (5' 1\")   Wt 77.1 kg (170 lb)   LMP 07/12/2024   SpO2 98%   BMI 32.12 kg/m    Gen: healthy,alert,no distress  Extremity: knee has point tenderness at Lateral aspect of patella.   There is compromise to the distal circulation.  Pulses are +2 and CRT is brisk  GENERAL APPEARANCE: healthy, alert and no distress  CHEST: clear to auscultation  CV: regular rate and rhythm  EXTREMITIES: peripheral pulses " normal  SKIN: no suspicious lesions or rashes  NEURO: Normal strength and tone, sensory exam grossly normal, mentation intact and speech normal    Results for orders placed or performed in visit on 08/12/24   XR Knee Left 3 Views     Status: None    Narrative    XR KNEE LEFT 3 VIEWS 8/12/2024 11:58 AM     HISTORY: Acute pain of left knee    COMPARISON: None.       Impression    IMPRESSION:  No fracture or dislocation. Probable trace left knee effusion.    Roni Pleitez MD         SYSTEM ID:  [id number]         ASSESSMENT:   (M25.562) chronic pain of left knee  (M25.462) Swelling of joint, knee, left  (M25.569) Knee tenderness  Plan: XR Knee Left 3 Views, ibuprofen (ADVIL/MOTRIN)         600 MG tablet, Ankle/Knee Bracing Supplies         Order Knee Sleeve/Brace; Left; Open, Orthopedic         Referral      Red flags and emergent follow up discussed, and understood by patient  Follow up with PCP if symptoms worsen or fail to improve

## 2024-08-27 NOTE — TELEPHONE ENCOUNTER
DIAGNOSIS: left Knee tenderness\ Grzegorz Jain PA-C [facility]\     APPOINTMENT DATE: 8.28.24   NOTES STATUS DETAILS   DISCHARGE REPORT from the ER Internal 8.12.24  [facility]   MEDICATION LIST Internal    XRAYS (IMAGES & REPORTS) Internal 8.12.24  XR Knee Left

## 2024-08-28 ENCOUNTER — OFFICE VISIT (OUTPATIENT)
Dept: ORTHOPEDICS | Facility: CLINIC | Age: 29
End: 2024-08-28
Attending: PHYSICIAN ASSISTANT
Payer: COMMERCIAL

## 2024-08-28 ENCOUNTER — PRE VISIT (OUTPATIENT)
Dept: ORTHOPEDICS | Facility: CLINIC | Age: 29
End: 2024-08-28

## 2024-08-28 DIAGNOSIS — M25.562 PATELLOFEMORAL ARTHRALGIA OF LEFT KNEE: Primary | ICD-10-CM

## 2024-08-28 PROCEDURE — 99203 OFFICE O/P NEW LOW 30 MIN: CPT | Performed by: FAMILY MEDICINE

## 2024-08-28 NOTE — PATIENT INSTRUCTIONS
[facility] Outpatient Physical Therapy Locations    To schedule an appointment please call our scheduling department at [phone]    To fax a referral to be scheduled fax to [phone]    [place]: [address], [facility]: [address] [facility]: [address], [facility]: [address], [facility]: [address], [facility]: [address] [facility]: [address] [place], [place]  [place]: [address] [facility] , [address], [facility]: [address] [facility] , [address], [facility]: [address] [facility]: [address], [facility]: [address], [facility]: [address] [facility]: [address], [facility]: [address] [facility]: [address] [address], [facility]: [facility]: [address] [facility], [address], [facility]: [address], [facility]: [address], [place], [place]  [place]: [address] [facility]: [address] [place] [address]: [address], [facility]: [address] [facility] [address], [place]  [place]: [address], [facility]: [address], [facility]: [address] [facility] [place], [place]  [facility]: [address] [place], [place]  [facility]: [address] [place] [address]  [place]: [address] [facility], [address], [facility] [address] [facility],  [facility]: [address] [facility] [place], [place]
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
## 2025-04-23 ENCOUNTER — OFFICE VISIT (OUTPATIENT)
Dept: URGENT CARE | Facility: URGENT CARE | Age: 30
End: 2025-04-23
Payer: COMMERCIAL

## 2025-04-23 VITALS
TEMPERATURE: 98.9 F | DIASTOLIC BLOOD PRESSURE: 72 MMHG | RESPIRATION RATE: 16 BRPM | OXYGEN SATURATION: 97 % | WEIGHT: 160 LBS | HEART RATE: 64 BPM | SYSTOLIC BLOOD PRESSURE: 113 MMHG | HEIGHT: 61 IN | BODY MASS INDEX: 30.21 KG/M2

## 2025-04-23 DIAGNOSIS — R23.4 SKIN FISSURES: Primary | ICD-10-CM

## 2025-04-23 DIAGNOSIS — K62.89 ANAL PAIN: ICD-10-CM

## 2025-04-23 PROCEDURE — 99213 OFFICE O/P EST LOW 20 MIN: CPT | Performed by: INTERNAL MEDICINE

## 2025-04-23 PROCEDURE — 3078F DIAST BP <80 MM HG: CPT | Performed by: INTERNAL MEDICINE

## 2025-04-23 PROCEDURE — 3074F SYST BP LT 130 MM HG: CPT | Performed by: INTERNAL MEDICINE

## 2025-04-23 PROCEDURE — 1125F AMNT PAIN NOTED PAIN PRSNT: CPT | Performed by: INTERNAL MEDICINE

## 2025-04-23 ASSESSMENT — PAIN SCALES - GENERAL: PAINLEVEL_OUTOF10: SEVERE PAIN (7)

## 2025-04-23 NOTE — PATIENT INSTRUCTIONS
Skin fissures near anus from over wiping  Not rectal fissure    Protect barrier at area - Vaseline or diaper cream   Water wipes/diaper wipes with low chemical    Ice  Aloe      symptoms of IBS  Goal is less bowel movements  Dietary changes - could less carbs    Recheck next month    For these skin fissures near anus, doesn't need Gastroenterology referral.

## 2025-04-23 NOTE — PROGRESS NOTES
"ASSESSMENT AND PLAN:      ICD-10-CM    1. Skin fissures  R23.4       2. Anal pain  K62.89         Patient Instructions   Skin fissures near anus from over wiping  Not rectal fissure    Protect barrier at area - Vaseline or diaper cream   Water wipes/diaper wipes with low chemical    Ice  Aloe      symptoms of IBS  Goal is less bowel movements  Dietary changes - could less carbs    Recheck next month    For these skin fissures near anus, doesn't need Gastroenterology referral.      Eneida Kee MD  [facility] URGENT CARE    Subjective     Cristina Ivy Gonzalez is a 29 year old who presents for Patient presents with:  Rectal/perineal Pain: X2 days of rectal pain   X4 days of discomfort   [facility]      Per nursing, patient here with 4 days of perineal discomfort  Now with rectal pain for the past 2 days    Assumed had fissures in past  Had blood on tissue with wiping.  No in stool  Had colonoscopy scheduled but loss of insurance  Recurring past few months with painful bowel movements  This week with Easter, most painful bowel movement  - maybe with diet changes  Now painful to sit, walk  Stinging pain    Treatment measures tried include: tucks, ice  Pcp at  clinic - has 5/20  Relief from treatment: yes  Significant past medical history: reviewed      Review of Systems   Gastrointestinal:         Bowel movements 2-3.  Sometime 4-5   Sometimes constipated, sometimes loose    Anxiety may contribute           Objective    /72   Pulse 64   Temp 98.9  F (37.2  C) (Temporal)   Resp 16   Ht 1.549 m (5' 1\")   Wt 72.6 kg (160 lb)   SpO2 97%   BMI 30.23 kg/m    Physical Exam  Genitourinary:     Rectum: Normal.      Comments: No rectal mass with rectal    No rectal vault fissures.  Hemorrhoid tissue - no thrombus    Skin radiating from anus with small skin cuts painful at 9 & 12 oclock                   "

## 2025-04-23 NOTE — PROGRESS NOTES
Urgent Care Clinic Visit    Chief Complaint   Patient presents with    Rectal/perineal Pain     X2 days of rectal pain   X4 days of discomfort     Urgent Care               4/23/2025    10:05 AM   Additional Questions   Roomed by hue mantilla

## 2025-05-27 ENCOUNTER — OFFICE VISIT (OUTPATIENT)
Dept: URGENT CARE | Facility: URGENT CARE | Age: 30
End: 2025-05-27
Payer: COMMERCIAL

## 2025-05-27 VITALS
RESPIRATION RATE: 16 BRPM | HEART RATE: 68 BPM | HEIGHT: 61 IN | SYSTOLIC BLOOD PRESSURE: 109 MMHG | DIASTOLIC BLOOD PRESSURE: 68 MMHG | OXYGEN SATURATION: 98 % | BODY MASS INDEX: 30.58 KG/M2 | TEMPERATURE: 98.3 F | WEIGHT: 162 LBS

## 2025-05-27 DIAGNOSIS — N76.4 BOIL OF VULVA: Primary | ICD-10-CM

## 2025-05-27 PROCEDURE — 99213 OFFICE O/P EST LOW 20 MIN: CPT | Performed by: FAMILY MEDICINE

## 2025-05-27 PROCEDURE — 3078F DIAST BP <80 MM HG: CPT | Performed by: FAMILY MEDICINE

## 2025-05-27 PROCEDURE — 3074F SYST BP LT 130 MM HG: CPT | Performed by: FAMILY MEDICINE

## 2025-05-27 PROCEDURE — 1125F AMNT PAIN NOTED PAIN PRSNT: CPT | Performed by: FAMILY MEDICINE

## 2025-05-27 RX ORDER — SULFAMETHOXAZOLE AND TRIMETHOPRIM 800; 160 MG/1; MG/1
1 TABLET ORAL 2 TIMES DAILY
Qty: 14 TABLET | Refills: 0 | Status: SHIPPED | OUTPATIENT
Start: 2025-05-27 | End: 2025-06-03

## 2025-05-27 ASSESSMENT — PAIN SCALES - GENERAL: PAINLEVEL_OUTOF10: MODERATE PAIN (6)

## 2025-05-27 NOTE — PATIENT INSTRUCTIONS
Please finish the antibiotic even if you are feeling better.  Watch for worsening signs of infection such as increasing redness increasing warmth increasing pain and any red streaking  Warm packs 2-3 times a day each time for 20 mins     Erika Barajas MD

## 2025-05-27 NOTE — PROGRESS NOTES
Urgent Care Clinic Visit    Chief Complaint   Patient presents with    Urgent Care     Pt in clinic to have eval for rectal cyst.  Pt states there is drainage from the area.               5/27/2025    10:10 AM   Additional Questions   Roomed by pierre         5/27/2025    10:10 AM   Patient Reported Additional Medications   Patient reports taking the following new medications inositol omega 3 magnesium

## 2025-05-27 NOTE — PROGRESS NOTES
Chief Complaint   Patient presents with    Urgent Care     Pt in clinic to have eval for rectal cyst.  Pt states there is drainage from the area.       Cristina was seen today for urgent care.    Diagnoses and all orders for this visit:    Boil of vulva  -     sulfamethoxazole-trimethoprim (BACTRIM DS) 800-160 MG tablet; Take 1 tablet by mouth 2 times daily for 7 days.      PLAN:  Return to clinic prn for pain, increased   swelling or fever.  As it is draining discussed with patient to continue doing warm packs to the area do sitz bath, with treat with an antibiotic.    Differential diagnosis boil, infected hair follicle, Bartholin cyst, infected sebaceous cyst,    SUBJECTIVE:  29 year old female presents with abscess formation on right perineal area just below the vulva  for 2 days.  No   fever or chills.  No history of diabetes.    OBJECTIVE:  Fluctuant abscess noted on the  right perineal area just below the vulva.  Size 1.5 cm. There is a tender lump noted in the right perineal area below the right vulva   No surrounding induration, mild erythema and tenderness and yellow-colored drainage noted . Afebrile.    Erika Barajas MD

## 2025-06-08 ENCOUNTER — HEALTH MAINTENANCE LETTER (OUTPATIENT)
Age: 30
End: 2025-06-08

## 2025-07-10 ENCOUNTER — OFFICE VISIT (OUTPATIENT)
Dept: URGENT CARE | Facility: URGENT CARE | Age: 30
End: 2025-07-10
Payer: COMMERCIAL

## 2025-07-10 VITALS
HEIGHT: 61 IN | DIASTOLIC BLOOD PRESSURE: 66 MMHG | BODY MASS INDEX: 29.27 KG/M2 | TEMPERATURE: 98.4 F | RESPIRATION RATE: 18 BRPM | SYSTOLIC BLOOD PRESSURE: 116 MMHG | OXYGEN SATURATION: 99 % | HEART RATE: 75 BPM | WEIGHT: 155 LBS

## 2025-07-10 DIAGNOSIS — R42 DIZZINESS: Primary | ICD-10-CM

## 2025-07-10 DIAGNOSIS — R11.0 NAUSEA: ICD-10-CM

## 2025-07-10 LAB
ANION GAP SERPL CALCULATED.3IONS-SCNC: 7 MMOL/L (ref 3–14)
BASOPHILS # BLD AUTO: 0 10E3/UL (ref 0–0.2)
BASOPHILS NFR BLD AUTO: 0 %
BUN SERPL-MCNC: 11 MG/DL (ref 7–30)
CALCIUM SERPL-MCNC: 9.7 MG/DL (ref 8.5–10.1)
CHLORIDE BLD-SCNC: 107 MMOL/L (ref 94–109)
CO2 SERPL-SCNC: 25 MMOL/L (ref 20–32)
CREAT SERPL-MCNC: 0.6 MG/DL (ref 0.52–1.04)
EGFRCR SERPLBLD CKD-EPI 2021: >90 ML/MIN/1.73M2
EOSINOPHIL # BLD AUTO: 0.1 10E3/UL (ref 0–0.7)
EOSINOPHIL NFR BLD AUTO: 1 %
ERYTHROCYTE [DISTWIDTH] IN BLOOD BY AUTOMATED COUNT: 14.2 % (ref 10–15)
GLUCOSE BLD-MCNC: 97 MG/DL (ref 70–99)
HCT VFR BLD AUTO: 43.2 % (ref 35–47)
HGB BLD-MCNC: 14 G/DL (ref 11.7–15.7)
IMM GRANULOCYTES # BLD: 0 10E3/UL
IMM GRANULOCYTES NFR BLD: 0 %
LYMPHOCYTES # BLD AUTO: 2.1 10E3/UL (ref 0.8–5.3)
LYMPHOCYTES NFR BLD AUTO: 25 %
MCH RBC QN AUTO: 27.5 PG (ref 26.5–33)
MCHC RBC AUTO-ENTMCNC: 32.4 G/DL (ref 31.5–36.5)
MCV RBC AUTO: 85 FL (ref 78–100)
MONOCYTES # BLD AUTO: 0.6 10E3/UL (ref 0–1.3)
MONOCYTES NFR BLD AUTO: 8 %
NEUTROPHILS # BLD AUTO: 5.5 10E3/UL (ref 1.6–8.3)
NEUTROPHILS NFR BLD AUTO: 66 %
PLATELET # BLD AUTO: 287 10E3/UL (ref 150–450)
POTASSIUM BLD-SCNC: 4 MMOL/L (ref 3.4–5.3)
RBC # BLD AUTO: 5.09 10E6/UL (ref 3.8–5.2)
SODIUM SERPL-SCNC: 139 MMOL/L (ref 135–145)
WBC # BLD AUTO: 8.3 10E3/UL (ref 4–11)

## 2025-07-10 RX ORDER — ONDANSETRON 4 MG/1
4 TABLET, ORALLY DISINTEGRATING ORAL ONCE
Status: COMPLETED | OUTPATIENT
Start: 2025-07-10 | End: 2025-07-10

## 2025-07-10 RX ORDER — MECLIZINE HCL 12.5 MG 12.5 MG/1
12.5 TABLET ORAL 3 TIMES DAILY PRN
Qty: 20 TABLET | Refills: 0 | Status: SHIPPED | OUTPATIENT
Start: 2025-07-10

## 2025-07-10 RX ADMIN — ONDANSETRON 4 MG: 4 TABLET, ORALLY DISINTEGRATING ORAL at 19:37

## 2025-07-10 NOTE — PROGRESS NOTES
Assessment & Plan     Dizziness  - meclizine (ANTIVERT) 12.5 MG tablet  Dispense: 20 tablet; Refill: 0  - CBC with platelets and differential  - Basic metabolic panel  (Ca, Cl, CO2, Creat, Gluc, K, Na, BUN)  - Physical Therapy  Referral    Nausea  - ondansetron (ZOFRAN ODT) ODT tab 4 mg       Symptoms suggestive of vertigo I will refer her to the balance clinic given the duration of her symptoms.  Her ear exam did not suggest an infection.  Vital signs in the normal range  Will check blood work for abnormalities  Meclizine made available to help with her symptoms    See AVS summary for additional recommendations reviewed with patient during this visit.         Prakash Encarnacion MD   [facility]    Margarette Evans is a 29 year old female who presents to clinic today for the following health issues:  Chief Complaint   Patient presents with    Urgent Care     Vertigo, lightheadedness. Left ear bothering her for a while now. Some nausea. Had some cold symptoms last week.      HPI    Patient has similar episodes many times in the past year this is more prominent than the other ones lasting for over a week.  Denies any sinus pressure or symptoms of respiratory infection although was exposed to her partner who was sick recently.  She has not any fevers.  No head injuries.  No weakness of the body.  No facial droop seen.  No formal diagnosed hx of vertigo    Denies hearing loss    Denies episodes of feeling faint      Patient Active Problem List    Diagnosis Date Noted    PCOS (polycystic ovarian syndrome) 04/04/2024     Priority: Medium    Anxiety 07/21/2020     Priority: Medium    Abnormal uterine bleeding (AUB) 08/26/2015     Priority: Medium    MDD (major depressive disorder), recurrent episode, moderate (H) 03/23/2015     Priority: Medium    JANET (generalized anxiety disorder) 03/23/2015     Priority: Medium    Dysthymic disorder 03/23/2015     Priority: Medium       Current Outpatient  "Medications   Medication Sig Dispense Refill    meclizine (ANTIVERT) 12.5 MG tablet Take 1 tablet (12.5 mg) by mouth 3 times daily as needed for dizziness. 20 tablet 0    bisacodyl (DULCOLAX) 5 MG EC tablet Two days prior to exam take two (2) tablets at 4pm. One day prior to exam take two (2) tablets at 4pm (Patient not taking: Reported on 5/27/2025) 4 tablet 0    drospirenone-ethinyl estradiol (CARLTON) 3-0.02 MG tablet Take 1 tablet by mouth daily (Patient not taking: Reported on 5/27/2025) 84 tablet 3    ibuprofen (ADVIL/MOTRIN) 600 MG tablet Take 1 tablet (600 mg) by mouth every 6 hours as needed for moderate pain (Patient not taking: Reported on 5/27/2025) 40 tablet 0     Current Facility-Administered Medications   Medication Dose Route Frequency Provider Last Rate Last Admin    ondansetron (ZOFRAN ODT) ODT tab 4 mg  4 mg Oral Once                Objective    /66   Pulse 75   Temp 98.4  F (36.9  C) (Oral)   Resp 18   Ht 1.549 m (5' 1\")   Wt 70.3 kg (155 lb)   LMP 04/21/2025   SpO2 99%   BMI 29.29 kg/m    Physical Exam   As noted above and including:   GEN: No acute distress  Neuro exam: Pronator drift negative, Romberg's negative, extraocular movements intact  Regular in rhythm no murmurs rubs or gallops  Nonlabored work of breathing  Normal muscle tone  Normal tympanic membrane's in the ear canal  No results found for any visits on 07/10/25.                  The use of Dragon/[employer] dictation services may have been used to construct the content in this note; any grammatical or spelling errors are non-intentional. Please contact the author of this note directly if you are in need of any clarification.   "

## 2025-07-10 NOTE — PROGRESS NOTES
Urgent Care Clinic Visit    Chief Complaint   Patient presents with    Urgent Care     Vertigo, lightheadedness. Left ear bothering her for a while now. Some nausea. Had some cold symptoms last week.                7/10/2025     3:57 PM   Additional Questions   Roomed by BANDAR Ambriz

## 2025-07-10 NOTE — PATIENT INSTRUCTIONS
We will call you if your blood work is abnormal      Try the meclizine with onset of dizziness      Get in for an appointment with the balance/vestibular clinic      Drink 80 ounces of water a day to keep hydrated      If symptoms worsen seek help right away